# Patient Record
Sex: MALE | Race: OTHER
[De-identification: names, ages, dates, MRNs, and addresses within clinical notes are randomized per-mention and may not be internally consistent; named-entity substitution may affect disease eponyms.]

---

## 2019-02-09 ENCOUNTER — TRANSCRIPTION ENCOUNTER (OUTPATIENT)
Age: 29
End: 2019-02-09

## 2022-10-11 PROBLEM — Z00.00 ENCOUNTER FOR PREVENTIVE HEALTH EXAMINATION: Status: ACTIVE | Noted: 2022-10-11

## 2024-04-12 ENCOUNTER — NON-APPOINTMENT (OUTPATIENT)
Age: 34
End: 2024-04-12

## 2024-08-08 ENCOUNTER — APPOINTMENT (OUTPATIENT)
Dept: ORTHOPEDIC SURGERY | Facility: CLINIC | Age: 34
End: 2024-08-08

## 2024-08-08 PROCEDURE — 99204 OFFICE O/P NEW MOD 45 MIN: CPT

## 2024-08-08 NOTE — IMAGING
[de-identified] : The patient is a well appearing 34 year old male of their stated age. Patient ambulates with a normal gait. PATINET PRESENTS IN KNEE BRACE. Negative straight leg raise bilateral   Effected Knee: RIGHT ROM:  0-140 degrees Lachman: Negative Pivot Shift: Negative Anterior Drawer: Negative Posterior Drawer / Sag: Negative Varus Stress 0 degrees: Stable Varus Stress 30 degrees: Stable Valgus Stress 0 degrees: 1+, 2+ GAPPING Valgus Stress 30 degrees: 1+, 2+ GAPPING Medial Seb: Negative Lateral Seb: Negative Patella Glide: 2+ Patella Apprehension: Negative Patella Grind: Negative   Palpation: Medial Joint Line: Nontender Lateral Joint Line: Nontender Medial Collateral Ligament: TENDER  Lateral Collateral Ligament/PLC: Nontender Distal Femur: Nontender Proximal Tibia: Nontender Tibial Tubercle: Nontender Distal Pole Patella: Nontender Quadriceps Tendon: Nontender &  Intact Patella Tendon: Nontender &  Intact Medial Femoral Condyle: TENDER  Medial Distal Hamstring/PES: Nontender Lateral Distal Hamstring: Nontender & Stable Iliotibial Band: Nontender Medial Patellofemoral Ligament: Nontender Adductor: Nontender Proximal GSC-Plantaris: Nontender Calf: Supple & Nontender   Inspection: Deformity: No Erythema: No Ecchymosis: No Abrasions: No Effusion: No Prepatella Bursitis: No Neurologic Exam: Sensation L4-S1: Grossly Intact Motor Exam: Quadriceps: 5 out of 5 Hamstrings: 5 out of 5 EHL: 5 out of 5 FHL: 5 out of 5 TA: 5 out of 5 GS: 5 out of 5 Circulatory/Pulses: Dorsalis Pedis: 2+ Posterior Tibialis: 2+ Additional Pertinent Findings: None     Contralateral Knee:                       ROM: 0-145 degrees Other Pertinent Findings: None   Assessment: The patient is a 34 year old male with Right knee pain and radiographic and physical exam findings consistent with 2nd degree MCL sprain and bone contusion The patient's condition is acute Documents/Results Reviewed Today: MRI right knee Tests/Studies Independently Interpreted Today: MRI right knee reveals bone marrow edema, lateral femoral condyle and proximal lateral tibial plateau, proximal mid substance MCL sprain Pertinent findings include: 0-140, 1+-2+ gapping at 0 and 30 degrees of valgus stress, Tender medial femoral condyle, Tender MCL Confounding medical conditions/concerns: None     Plan: Discussed treatment options for the patient's MCL sprain. The patient will start Physical Therapy, HEP and stretching. Recommended obtaining Reparel sleeve and Voltaren Gel to help aid in relief. The patient will begin use of Playmaker knee brace to ensure stability and avoid further injury - prescription given today. Discussed appropriate use of OTC anti-inflammatories and analgesics (including but not limited to Aleve, Advil, Tylenol, Motrin, Ibuprofen, Voltaren gel, etc.)  Tests Ordered: None Prescription Medications Ordered: Discussed appropriate use of OTC anti-inflammatories and analgesics (including but not limited to Aleve, Advil, Tylenol, Motrin, Ibuprofen, Voltaren gel, etc.) Braces/DME Ordered: Playmaker knee brace, Reparel sleeve Activity/Work/Sports Status: None Additional Instructions: Voltaren Gel Follow-Up:  3 weeks   The patient's current medication management of their orthopedic diagnosis was reviewed today: (1) We discussed a comprehensive treatment plan that included possible pharmaceutical management involving the use of prescription strength medications including but not limited to options such as oral Naprosyn 500mg BID, once daily Meloxicam 15 mg, or 500-650 mg Tylenol versus over-the-counter oral medications and topical prescription NSAID Pennsaid vs over the counter Voltaren gel.  Based on our extensive discussion, the patient declined prescription medication and will use over the counter Advil, Alleve, Voltaren Gel or Tylenol as directed. (2) There is a moderate risk of morbidity with further treatment, especially from use of prescription strength medications and possible side effects of these medications which include upset stomach with oral medications, skin reactions to topical medications and cardiac/renal issues with long term use. (3) I recommended that the patient follow-up with their medical physician to discuss any significant specific potential issues with long term medication use such as interactions with current medications or with exacerbation of underlying medical comorbidities. (4) The benefits and risks associated with use of injectable, oral or topical, prescription and over the counter anti-inflammatory medications were discussed with the patient. The patient voiced understanding of the risks including but not limited to bleeding, stroke, kidney dysfunction, heart disease, and were referred to the black box warning label for further information.  I, Reshma Ledesma attest that this documentation has been prepared under the direction and in the presence of Ricci Avitia PA-C.  The documentation recorded by the scribe accurately reflects the service I Ricci Avitia PA-C personally performed and the decisions made by me.

## 2024-08-08 NOTE — DATA REVIEWED
[MRI] : MRI [Right] : of the right [Knee] : knee [Report was reviewed and noted in the chart] : The report was reviewed and noted in the chart [I independently reviewed and interpreted images and report] : I independently reviewed and interpreted images and report [I reviewed the films/CD and additionally noted] : I reviewed the films/CD and additionally noted [FreeTextEntry1] :  MRI right knee reveals bone marrow edema, lateral femoral condyle and proximal lateral tibial plateau, proximal mid substance MCL sprain

## 2024-08-08 NOTE — HISTORY OF PRESENT ILLNESS
[de-identified] : The patient is a 34 year old right hand dominant male who presents today complaining of right knee pain.  Date of Injury/Onset: 7/20/24 Pain:    At Rest: 1/10  With Activity:  5/10  Mechanism of injury: Sliding during baseball foot got caught in the turf, felt the knee move awkwardly  This is NOT a Work Related Injury being treated under Worker's Compensation. This is NOT an athletic injury occurring associated with an interscholastic or organized sports team. Quality of symptoms: medial knee pain, swelling, limited ROM, clicking Improves with: brace, rest  Worse with: prolonged activity Prior treatment: Total Ortho walk in  Prior Imaging: MRI [facility] 8/1/24 Out of work/sport: Currently working  School/Sport/Position/Occupation:  construction sites, baseball infield, golf, pickleball  Additional Information: None

## 2024-08-30 ENCOUNTER — APPOINTMENT (OUTPATIENT)
Dept: ORTHOPEDIC SURGERY | Facility: CLINIC | Age: 34
End: 2024-08-30
Payer: COMMERCIAL

## 2024-08-30 VITALS — BODY MASS INDEX: 25.9 KG/M2 | HEIGHT: 71 IN | WEIGHT: 185 LBS

## 2024-08-30 DIAGNOSIS — T14.8XXA OTHER INJURY OF UNSPECIFIED BODY REGION, INITIAL ENCOUNTER: ICD-10-CM

## 2024-08-30 DIAGNOSIS — S83.411A SPRAIN OF MEDIAL COLLATERAL LIGAMENT OF RIGHT KNEE, INITIAL ENCOUNTER: ICD-10-CM

## 2024-08-30 PROCEDURE — 99213 OFFICE O/P EST LOW 20 MIN: CPT

## 2024-08-30 NOTE — IMAGING
[de-identified] : The patient is a well appearing 34 year old male of their stated age. Patient ambulates with a normal gait.  Negative straight leg raise bilateral   Effected Knee: RIGHT ROM:  0-140 degrees Lachman: Negative Pivot Shift: Negative Anterior Drawer: Negative Posterior Drawer / Sag: Negative Varus Stress 0 degrees: Stable Varus Stress 30 degrees: Stable Valgus Stress 0 degrees: 1+, 2+ GAPPING Valgus Stress 30 degrees: 1+, 2+ GAPPING Medial Seb: Negative Lateral Seb: Negative Patella Glide: 2+ Patella Apprehension: Negative Patella Grind: Negative   Palpation: Medial Joint Line: Nontender Lateral Joint Line: Nontender Medial Collateral Ligament: TENDER  Lateral Collateral Ligament/PLC: Nontender Distal Femur: Nontender Proximal Tibia: Nontender Tibial Tubercle: Nontender Distal Pole Patella: Nontender Quadriceps Tendon: Nontender &  Intact Patella Tendon: Nontender &  Intact Medial Femoral Condyle: TENDER  Medial Distal Hamstring/PES: Nontender Lateral Distal Hamstring: Nontender & Stable Iliotibial Band: Nontender Medial Patellofemoral Ligament: Nontender Adductor: Nontender Proximal GSC-Plantaris: Nontender Calf: Supple & Nontender   Inspection: Deformity: No Erythema: No Ecchymosis: No Abrasions: No Effusion: No Prepatella Bursitis: No Neurologic Exam: Sensation L4-S1: Grossly Intact Motor Exam: Quadriceps: 5 out of 5 Hamstrings: 5 out of 5 EHL: 5 out of 5 FHL: 5 out of 5 TA: 5 out of 5 GS: 5 out of 5 Circulatory/Pulses: Dorsalis Pedis: 2+ Posterior Tibialis: 2+ Additional Pertinent Findings: None   Contralateral Knee:                       ROM: 0-145 degrees Other Pertinent Findings: None   Assessment: The patient is a 34 year old male with Right knee pain and radiographic and physical exam findings consistent with 2nd degree MCL sprain and bone contusion The patient's condition is acute Documents/Results Reviewed Today: None Tests/Studies Independently Interpreted Today: None Pertinent findings include: 0-140, 1+-2+ gapping at 0 and 30 degrees of valgus stress, Tender medial femoral condyle, Tender MCL Confounding medical conditions/concerns: None   Plan: Patient reports gradual improvement with mechanical symptoms related to MCL sprain. The patient will continue Physical Therapy, HEP and stretching. Recommended obtaining Reparel sleeve and Voltaren Gel to help aid in relief. Stressed importance of continue use of Playmaker knee brace to ensure stability and avoid further injury.  Discussed appropriate use of OTC anti-inflammatories and analgesics (including but not limited to Aleve, Advil, Tylenol, Motrin, Ibuprofen, Voltaren gel, etc.)  Tests Ordered: None Prescription Medications Ordered: Discussed appropriate use of OTC anti-inflammatories and analgesics (including but not limited to Aleve, Advil, Tylenol, Motrin, Ibuprofen, Voltaren gel, etc.) Braces/DME Ordered: Playmaker knee brace, Reparel sleeve Activity/Work/Sports Status: None Additional Instructions: Voltaren Gel Follow-Up:  6 weeks   The patient's current medication management of their orthopedic diagnosis was reviewed today: (1) We discussed a comprehensive treatment plan that included possible pharmaceutical management involving the use of prescription strength medications including but not limited to options such as oral Naprosyn 500mg BID, once daily Meloxicam 15 mg, or 500-650 mg Tylenol versus over-the-counter oral medications and topical prescription NSAID Pennsaid vs over the counter Voltaren gel.  Based on our extensive discussion, the patient declined prescription medication and will use over the counter Advil, Alleve, Voltaren Gel or Tylenol as directed. (2) There is a moderate risk of morbidity with further treatment, especially from use of prescription strength medications and possible side effects of these medications which include upset stomach with oral medications, skin reactions to topical medications and cardiac/renal issues with long term use. (3) I recommended that the patient follow-up with their medical physician to discuss any significant specific potential issues with long term medication use such as interactions with current medications or with exacerbation of underlying medical comorbidities. (4) The benefits and risks associated with use of injectable, oral or topical, prescription and over the counter anti-inflammatory medications were discussed with the patient. The patient voiced understanding of the risks including but not limited to bleeding, stroke, kidney dysfunction, heart disease, and were referred to the black box warning label for further information.  Reshma ROMERO attest that this documentation has been prepared under the direction and in the presence of Marilu Agrawal PA-C.  The documentation recorded by the scribe accurately reflects the service Marilu ROMERO PA-C, personally performed and the decisions made by me.

## 2024-08-30 NOTE — HISTORY OF PRESENT ILLNESS
[de-identified] : The patient is a 34 year old right hand dominant male who presents today complaining of right knee pain.  Date of Injury/Onset: 7/20/24 Pain:    At Rest: 0/10  With Activity:  1-2/10  Mechanism of injury: Sliding during baseball foot got caught in the turf, felt the knee move awkwardly  This is NOT a Work Related Injury being treated under Worker's Compensation. This is NOT an athletic injury occurring associated with an interscholastic or organized sports team. Quality of symptoms: medial knee pain, swelling, limited ROM, clicking Improves with: brace, rest  Worse with: prolonged activity Treatment/Imaging/Studies Since Last Visit: HEP                       Reports Available For Review Today: none Out of work/sport: Currently working  School/Sport/Position/Occupation:  construction sites, baseball infield, golf, pickleball  Changes since last visit: Feeling better. Has not been in PT due to schedule conflicts. Using playmaker when playing baseball, sometimes at work.  Additional Information: None